# Patient Record
Sex: FEMALE | Race: OTHER | HISPANIC OR LATINO | URBAN - METROPOLITAN AREA
[De-identification: names, ages, dates, MRNs, and addresses within clinical notes are randomized per-mention and may not be internally consistent; named-entity substitution may affect disease eponyms.]

---

## 2021-02-26 ENCOUNTER — EMERGENCY (EMERGENCY)
Facility: HOSPITAL | Age: 66
LOS: 1 days | Discharge: HOME | End: 2021-02-26
Attending: EMERGENCY MEDICINE
Payer: MEDICAID

## 2021-02-26 VITALS
HEART RATE: 94 BPM | HEIGHT: 64 IN | WEIGHT: 184.09 LBS | TEMPERATURE: 98 F | SYSTOLIC BLOOD PRESSURE: 145 MMHG | RESPIRATION RATE: 17 BRPM | OXYGEN SATURATION: 100 % | DIASTOLIC BLOOD PRESSURE: 81 MMHG

## 2021-02-26 PROCEDURE — 73130 X-RAY EXAM OF HAND: CPT

## 2021-02-26 PROCEDURE — 99284 EMERGENCY DEPT VISIT MOD MDM: CPT

## 2021-02-26 PROCEDURE — 90471 IMMUNIZATION ADMIN: CPT

## 2021-02-26 PROCEDURE — 73130 X-RAY EXAM OF HAND: CPT | Mod: 26,RT

## 2021-02-26 PROCEDURE — 99283 EMERGENCY DEPT VISIT LOW MDM: CPT | Mod: 25

## 2021-02-26 PROCEDURE — 90715 TDAP VACCINE 7 YRS/> IM: CPT

## 2021-02-26 RX ORDER — IBUPROFEN 200 MG
600 TABLET ORAL ONCE
Refills: 0 | Status: COMPLETED | OUTPATIENT
Start: 2021-02-26 | End: 2021-02-26

## 2021-02-26 RX ORDER — TETANUS TOXOID, REDUCED DIPHTHERIA TOXOID AND ACELLULAR PERTUSSIS VACCINE, ADSORBED 5; 2.5; 8; 8; 2.5 [IU]/.5ML; [IU]/.5ML; UG/.5ML; UG/.5ML; UG/.5ML
0.5 SUSPENSION INTRAMUSCULAR ONCE
Refills: 0 | Status: COMPLETED | OUTPATIENT
Start: 2021-02-26 | End: 2021-02-26

## 2021-02-26 RX ADMIN — TETANUS TOXOID, REDUCED DIPHTHERIA TOXOID AND ACELLULAR PERTUSSIS VACCINE, ADSORBED 0.5 MILLILITER(S): 5; 2.5; 8; 8; 2.5 SUSPENSION INTRAMUSCULAR at 23:48

## 2021-02-26 RX ADMIN — Medication 600 MILLIGRAM(S): at 23:47

## 2021-02-26 RX ADMIN — Medication 1 TABLET(S): at 23:47

## 2021-02-27 RX ORDER — BACITRACIN ZINC 500 UNIT/G
1 OINTMENT IN PACKET (EA) TOPICAL
Qty: 28 | Refills: 0
Start: 2021-02-27 | End: 2021-03-05

## 2021-02-27 NOTE — ED PROVIDER NOTE - NSFOLLOWUPCLINICS_GEN_ALL_ED_FT
[facility]  Hand Surgery  [address]  [place] [address]  Phone: [phone]  Fax:   Follow Up Time:

## 2021-02-27 NOTE — ED PROVIDER NOTE - PATIENT PORTAL LINK FT
You can access the FollowMyHealth Patient Portal offered by [facility] by registering at the following website: [email or web address]. By joining [employer]’s FollowMyHealth portal, you will also be able to view your health information using other applications (apps) compatible with our system.

## 2021-02-27 NOTE — ED PROVIDER NOTE - PHYSICAL EXAMINATION
right hand with 4 separate small lacerations on thenar area and one laceration with small amount of adipose coming out. full ROM all joints. no significant swelling. normal cap refill. normal gross sensation.

## 2021-02-27 NOTE — ED PROVIDER NOTE - OBJECTIVE STATEMENT
65year old female PMH HTN coming in with dog bite to right hand. state nisha 2 dogs were fighting and she tried to separate them and got bit. unsure last tetanus shot. dogs are UTD with vaccinations including rabies. denies all other complaints. was concerned because wound looked open on her hand.

## 2021-02-27 NOTE — ED PROVIDER NOTE - PROGRESS NOTE DETAILS
no fx on xray. cleansed lacerations. lacs to heal by secondary intetion given dog bite. given tetanus shot, augmentin, motrin. advised f/u with PMD. return precautions discussed.

## 2021-02-27 NOTE — ED PROVIDER NOTE - NSFOLLOWUPINSTRUCTIONS_ED_ALL_ED_FT
[facility]                                                                                     Cuidado de las heridas, en adultos    Wound Care, Adult      El cuidado correcto de las heridas puede ayudar a evitar el dolor y a prevenir las infecciones y formación de cicatrices. Además, puede ayudar a que la cicatrización sea más rápida. Siga las instrucciones del médico acerca del cuidado de la herida.      Materiales necesarios:    •Agua y jabón.      •Limpiador de heridas.      •Gasa.      •Si es necesario, joel venda limpia (vendaje) u otro tipo de material de vendaje para cubrir o colocar en la herida. Siga las instrucciones del médico acerca de qué materiales de vendaje usar.      •Crema o pomada para aplicar en la herida, si se lo indicó el médico.        Cómo cuidar la herida    Limpieza de la herida   Pregúntele al médico cómo limpiar la herida. Bier puede incluir:  •Usar agua y jabón suave o un limpiador de heridas.      •Usar joel gasa limpia para secar la herida dando palmaditas después de limpiarla. No se frote ni restriegue la herida.      Cuidado del vendaje    •Lávese las cuate con agua y jabón lauri al menos 20 segundos antes y después de cambiar el vendaje. Use desinfectante para cuate si no dispone de agua y jabón.    •Cambie el vendaje aubree se lo haya indicado el médico. Bier puede incluir:  •Limpieza o enjuague (irrigación) de la herida.      •Colocar un vendaje sobre la herida o dentro de la herida (taponamiento).      •Cubrir la herida con un vendaje externo.        •No retire los puntos (suturas), la goma para cerrar la piel o las tiras adhesivas. Es posible que estos cierres cutáneos deban quedar puestos en la piel lauri 2 semanas o más tiempo. Si los bordes de las tiras adhesivas empiezan a despegarse y enroscarse, puede recortar los que estén sueltos. No retire las tiras adhesivas por completo a menos que el médico se lo indique.      •Pregúntele al médico cuándo puede dejar la herida al descubierto.        Detección de infección  Controle la shailesh de la herida todos los días para detectar signos de infección. Esté atento a los siguientes signos:  •Aumento del enrojecimiento, la hinchazón o el dolor.      •Líquido o luci.      •Calor.      •Pus o mal olor.        Sigue estas instrucciones en tu casa    Medicamentos     •Si le recetaron un medicamento, joel crema o un ungüento con antibiótico, tómelo o aplíqueselo aubree se lo haya indicado el médico. No deje de usar el antibiótico aunque la afección mejore.      •Si le recetaron un analgésico, tómelo 30 minutos antes de realizar el cuidado de la herida, aubree se lo haya indicado el médico.      •[place] los medicamentos de venta caroline y los recetados solamente aubree se lo haya indicado el médico.      Comida y bebida   •Siga joel dieta que incluya proteínas, vitaminas A y C y otros alimentos ricos en nutrientes que ayudarán a que la herida cicatrice.  •Los alimentos ricos en proteína incluyen carne, pescado, huevos, productos lácteos, frijoles y daria secos.      •Los alimentos ricos en vitamina A incluyen zanahorias y verduras de hoja chelsea oscuro.      •Los alimentos ricos en vitamina C incluyen cítricos, tomates, brócoli y pimientos.        •Lucy suficiente líquido aubree para mantener la orina de color amarillo pálido.      Instrucciones generales     • No tome tanya de inmersión, nade, use el jacuzzi ni key ninguna actividad en la que sumerja la herida en agua hasta que el médico lo autorice. Pregúntele al médico si puede ducharse. Reyes vez solo le permitan darse tanya de esponja.      • No se rasque ni se toque la herida. Manténgala cubierta reyes aubree se lo haya indicado el médico.      •Retome kelly actividades normales según lo indicado por el médico. Pregúntele al médico qué actividades son seguras para usted.      •Proteja la herida del sol lauri los primeros 6 meses o lauri el tiempo que le haya indicado el médico. Cubra la shailesh de la cicatriz o aplíquele pantalla solar con un factor de protección solar (FPS) de al menos 30.      • No consuma ningún producto que contenga nicotina o tabaco, aubree cigarrillos, cigarrillos electrónicos y tabaco de mascar. El consumo de esos productos puede demorar la cicatrización de la herida. Si necesita ayuda para dejar de fumar, consulte al médico.      •Concurra a todas las visitas de seguimiento aubree se lo haya indicado el médico. Bier es importante.        Comuníquese con un médico si:    •Le aplicaron la antitetánica y tiene hinchazón, dolor intenso, enrojecimiento o hemorragia en el sitio de la inyección.      •El dolor no se olu con los medicamentos.    •Tiene cualquiera de estos signos de infección:  •Más enrojecimiento, hinchazón o dolor alrededor de la herida.      •Líquido o luci que sale de la herida.      •Calor que proviene de la herida.      •Advierte pus o mal olor que provienen de la herida.      •Fiebre o escalofríos.        •Siente náuseas o vomita.      •Tiene mareos.        Solicite ayuda de inmediato si:    •Tiene joel línea alisa en la piel cerca de la shailesh que rodea la herida.      •La herida fue cerrada con grapas, suturas, goma para cerrar la piel o tiras adhesivas y estas comienzan a abrirse y a separarse.      •La herida sangra y el sangrado no se detiene con joel presión suave.      •Tiene joel erupción cutánea.      •Se desmaya.      •Tiene dificultad para respirar.      Estos síntomas pueden representar un problema grave que constituye joel emergencia. No espere a nancie si los síntomas desaparecen. Solicite atención médica de inmediato. Comuníquese con el servicio de emergencias de vazquez localidad (911 en los [place]). No conduzca por kelly propios medios hasta el hospital.       Resumen    •Siempre lávese las cuate con agua y jabón lauri al menos 20 segundos antes y después de cambiar el vendaje.      •Cambie el vendaje aubree se lo haya indicado el médico.      •Para ayudar con la cicatrización, coma alimentos ricos en proteínas, vitaminas A y C y demás nutrientes.      •Controle la herida todos los días para detectar signos de infección. Comuníquese con el médico si sospecha que la herida está infectada.      Esta información no tiene aubree fin reemplazar el consejo del médico. Asegúrese de hacerle al médico cualquier pregunta que tenga.      Document Revised: 12/29/2020 Document Reviewed: 12/29/2020    Elsevier Patient Education © 2021 [employer] Inc[employer]

## 2021-12-21 ENCOUNTER — EMERGENCY (EMERGENCY)
Facility: HOSPITAL | Age: 66
LOS: 1 days | Discharge: HOME | End: 2021-12-21
Attending: EMERGENCY MEDICINE
Payer: MEDICAID

## 2021-12-21 VITALS
DIASTOLIC BLOOD PRESSURE: 79 MMHG | HEIGHT: 64 IN | WEIGHT: 190.04 LBS | SYSTOLIC BLOOD PRESSURE: 125 MMHG | HEART RATE: 90 BPM | TEMPERATURE: 98 F | RESPIRATION RATE: 17 BRPM | OXYGEN SATURATION: 97 %

## 2021-12-21 VITALS — TEMPERATURE: 99 F

## 2021-12-21 LAB
ALBUMIN SERPL ELPH-MCNC: 3.6 G/DL — SIGNIFICANT CHANGE UP (ref 3.5–5)
ALP SERPL-CCNC: 89 U/L — SIGNIFICANT CHANGE UP (ref 40–120)
ALT FLD-CCNC: 40 U/L DA — SIGNIFICANT CHANGE UP (ref 10–60)
ANION GAP SERPL CALC-SCNC: 8 MMOL/L — SIGNIFICANT CHANGE UP (ref 5–17)
APPEARANCE UR: CLEAR — SIGNIFICANT CHANGE UP
AST SERPL-CCNC: 24 U/L — SIGNIFICANT CHANGE UP (ref 10–40)
BASOPHILS # BLD AUTO: 0.03 K/UL — SIGNIFICANT CHANGE UP (ref 0–0.2)
BASOPHILS NFR BLD AUTO: 0.4 % — SIGNIFICANT CHANGE UP (ref 0–2)
BILIRUB SERPL-MCNC: 0.4 MG/DL — SIGNIFICANT CHANGE UP (ref 0.2–1.2)
BILIRUB UR-MCNC: NEGATIVE — SIGNIFICANT CHANGE UP
BUN SERPL-MCNC: 18 MG/DL — SIGNIFICANT CHANGE UP (ref 7–18)
CALCIUM SERPL-MCNC: 9.4 MG/DL — SIGNIFICANT CHANGE UP (ref 8.4–10.5)
CHLORIDE SERPL-SCNC: 109 MMOL/L — HIGH (ref 96–108)
CO2 SERPL-SCNC: 24 MMOL/L — SIGNIFICANT CHANGE UP (ref 22–31)
COLOR SPEC: YELLOW — SIGNIFICANT CHANGE UP
CREAT SERPL-MCNC: 0.87 MG/DL — SIGNIFICANT CHANGE UP (ref 0.5–1.3)
DIFF PNL FLD: ABNORMAL
EOSINOPHIL # BLD AUTO: 0.07 K/UL — SIGNIFICANT CHANGE UP (ref 0–0.5)
EOSINOPHIL NFR BLD AUTO: 0.9 % — SIGNIFICANT CHANGE UP (ref 0–6)
GLUCOSE SERPL-MCNC: 105 MG/DL — HIGH (ref 70–99)
GLUCOSE UR QL: NEGATIVE — SIGNIFICANT CHANGE UP
HCT VFR BLD CALC: 39.4 % — SIGNIFICANT CHANGE UP (ref 34.5–45)
HGB BLD-MCNC: 12.6 G/DL — SIGNIFICANT CHANGE UP (ref 11.5–15.5)
IMM GRANULOCYTES NFR BLD AUTO: 0.3 % — SIGNIFICANT CHANGE UP (ref 0–1.5)
KETONES UR-MCNC: NEGATIVE — SIGNIFICANT CHANGE UP
LEUKOCYTE ESTERASE UR-ACNC: NEGATIVE — SIGNIFICANT CHANGE UP
LYMPHOCYTES # BLD AUTO: 1.7 K/UL — SIGNIFICANT CHANGE UP (ref 1–3.3)
LYMPHOCYTES # BLD AUTO: 22.7 % — SIGNIFICANT CHANGE UP (ref 13–44)
MCHC RBC-ENTMCNC: 27.5 PG — SIGNIFICANT CHANGE UP (ref 27–34)
MCHC RBC-ENTMCNC: 32 GM/DL — SIGNIFICANT CHANGE UP (ref 32–36)
MCV RBC AUTO: 86 FL — SIGNIFICANT CHANGE UP (ref 80–100)
MONOCYTES # BLD AUTO: 0.62 K/UL — SIGNIFICANT CHANGE UP (ref 0–0.9)
MONOCYTES NFR BLD AUTO: 8.3 % — SIGNIFICANT CHANGE UP (ref 2–14)
NEUTROPHILS # BLD AUTO: 5.05 K/UL — SIGNIFICANT CHANGE UP (ref 1.8–7.4)
NEUTROPHILS NFR BLD AUTO: 67.4 % — SIGNIFICANT CHANGE UP (ref 43–77)
NITRITE UR-MCNC: NEGATIVE — SIGNIFICANT CHANGE UP
NRBC # BLD: 0 /100 WBCS — SIGNIFICANT CHANGE UP (ref 0–0)
PH UR: 7 — SIGNIFICANT CHANGE UP (ref 5–8)
PLATELET # BLD AUTO: 274 K/UL — SIGNIFICANT CHANGE UP (ref 150–400)
POTASSIUM SERPL-MCNC: 4 MMOL/L — SIGNIFICANT CHANGE UP (ref 3.5–5.3)
POTASSIUM SERPL-SCNC: 4 MMOL/L — SIGNIFICANT CHANGE UP (ref 3.5–5.3)
PROT SERPL-MCNC: 7.4 G/DL — SIGNIFICANT CHANGE UP (ref 6–8.3)
PROT UR-MCNC: NEGATIVE — SIGNIFICANT CHANGE UP
RBC # BLD: 4.58 M/UL — SIGNIFICANT CHANGE UP (ref 3.8–5.2)
RBC # FLD: 13.1 % — SIGNIFICANT CHANGE UP (ref 10.3–14.5)
SODIUM SERPL-SCNC: 141 MMOL/L — SIGNIFICANT CHANGE UP (ref 135–145)
SP GR SPEC: 1.01 — SIGNIFICANT CHANGE UP (ref 1.01–1.02)
TROPONIN I, HIGH SENSITIVITY RESULT: 5.7 NG/L — SIGNIFICANT CHANGE UP
UROBILINOGEN FLD QL: NEGATIVE — SIGNIFICANT CHANGE UP
WBC # BLD: 7.49 K/UL — SIGNIFICANT CHANGE UP (ref 3.8–10.5)
WBC # FLD AUTO: 7.49 K/UL — SIGNIFICANT CHANGE UP (ref 3.8–10.5)

## 2021-12-21 PROCEDURE — G1004: CPT

## 2021-12-21 PROCEDURE — 80053 COMPREHEN METABOLIC PANEL: CPT

## 2021-12-21 PROCEDURE — 36415 COLL VENOUS BLD VENIPUNCTURE: CPT

## 2021-12-21 PROCEDURE — 84484 ASSAY OF TROPONIN QUANT: CPT

## 2021-12-21 PROCEDURE — 70450 CT HEAD/BRAIN W/O DYE: CPT | Mod: MG

## 2021-12-21 PROCEDURE — 85025 COMPLETE CBC W/AUTO DIFF WBC: CPT

## 2021-12-21 PROCEDURE — 70450 CT HEAD/BRAIN W/O DYE: CPT | Mod: 26,MG

## 2021-12-21 PROCEDURE — 81001 URINALYSIS AUTO W/SCOPE: CPT

## 2021-12-21 PROCEDURE — 93005 ELECTROCARDIOGRAM TRACING: CPT

## 2021-12-21 PROCEDURE — 82962 GLUCOSE BLOOD TEST: CPT

## 2021-12-21 PROCEDURE — 99284 EMERGENCY DEPT VISIT MOD MDM: CPT | Mod: 25

## 2021-12-21 PROCEDURE — 99285 EMERGENCY DEPT VISIT HI MDM: CPT

## 2021-12-21 RX ORDER — MECLIZINE HCL 12.5 MG
25 TABLET ORAL ONCE
Refills: 0 | Status: COMPLETED | OUTPATIENT
Start: 2021-12-21 | End: 2021-12-21

## 2021-12-21 RX ORDER — MECLIZINE HCL 12.5 MG
1 TABLET ORAL
Qty: 30 | Refills: 0
Start: 2021-12-21

## 2021-12-21 RX ADMIN — Medication 25 MILLIGRAM(S): at 14:10

## 2021-12-21 NOTE — ED PROVIDER NOTE - NSICDXPASTMEDICALHX_GEN_ALL_CORE_FT
PAST MEDICAL HISTORY:  Hodgkin's lymphoma     HTN (hypertension)     Lymphoma in remission     Prediabetes

## 2021-12-21 NOTE — ED PROVIDER NOTE - PROGRESS NOTE DETAILS
pt reassessed and dizziness resolved.  labs and imaging negative.  Pt given copy of all results.  Will dc

## 2021-12-21 NOTE — ED ADULT NURSE NOTE - OBJECTIVE STATEMENT
pt is a66 y/o female w/ c/o dizziness and high blood pressure  pt  Alert oriented x3 with no signs of any distress nor discomfort pt no  c/o any pain , ambulates  no signs of any deficit  noted . follow simple instruction and follow simple command.

## 2021-12-21 NOTE — ED PROVIDER NOTE - OBJECTIVE STATEMENT
65 y/o female, w/ history of hypertension, prediabetes, and Hodgkin's lymphoma, in remission, says for the past day has been feeling "weird" and not like herself. Patient awoke this morning and felt dizzy, described as both room spinning and weak and lightheaded. Patient has had these symptoms previously, but not regularly. Patient was at work, they took her blood pressure, and it was 155/85. Patient is normally 120/80 so this is high for her and they sent her to ED for further evaluation. In ED, blood pressure is normal. Patient denies any other symptoms. NKDA.

## 2021-12-21 NOTE — ED PROVIDER NOTE - PATIENT PORTAL LINK FT
You can access the FollowMyHealth Patient Portal offered by [facility] by registering at the following website: [email or web address]. By joining [employer]’s FollowMyHealth portal, you will also be able to view your health information using other applications (apps) compatible with our system.

## 2023-03-27 ENCOUNTER — EMERGENCY (EMERGENCY)
Facility: HOSPITAL | Age: 68
LOS: 1 days | Discharge: HOME | End: 2023-03-27
Attending: EMERGENCY MEDICINE
Payer: MEDICAID

## 2023-03-27 PROBLEM — I10 ESSENTIAL (PRIMARY) HYPERTENSION: Chronic | Status: ACTIVE | Noted: 2021-12-21

## 2023-03-27 PROBLEM — C85.90 NON-HODGKIN LYMPHOMA, UNSPECIFIED, UNSPECIFIED SITE: Chronic | Status: ACTIVE | Noted: 2021-12-21

## 2023-03-27 PROBLEM — C81.90 HODGKIN LYMPHOMA, UNSPECIFIED, UNSPECIFIED SITE: Chronic | Status: ACTIVE | Noted: 2021-12-21

## 2023-03-27 PROBLEM — R73.03 PREDIABETES: Chronic | Status: ACTIVE | Noted: 2021-12-21

## 2023-03-27 PROCEDURE — 84484 ASSAY OF TROPONIN QUANT: CPT

## 2023-03-27 PROCEDURE — 93010 ELECTROCARDIOGRAM REPORT: CPT

## 2023-03-27 PROCEDURE — 81001 URINALYSIS AUTO W/SCOPE: CPT

## 2023-03-27 PROCEDURE — 87635 SARS-COV-2 COVID-19 AMP PRB: CPT

## 2023-03-27 PROCEDURE — 99284 EMERGENCY DEPT VISIT MOD MDM: CPT

## 2023-03-27 PROCEDURE — 83690 ASSAY OF LIPASE: CPT

## 2023-03-27 PROCEDURE — 71046 X-RAY EXAM CHEST 2 VIEWS: CPT

## 2023-03-27 PROCEDURE — 80053 COMPREHEN METABOLIC PANEL: CPT

## 2023-03-27 PROCEDURE — 36415 COLL VENOUS BLD VENIPUNCTURE: CPT

## 2023-03-27 PROCEDURE — 96374 THER/PROPH/DIAG INJ IV PUSH: CPT

## 2023-03-27 PROCEDURE — 82962 GLUCOSE BLOOD TEST: CPT

## 2023-03-27 PROCEDURE — 71046 X-RAY EXAM CHEST 2 VIEWS: CPT | Mod: 26

## 2023-03-27 PROCEDURE — 70450 CT HEAD/BRAIN W/O DYE: CPT

## 2023-03-27 PROCEDURE — 93005 ELECTROCARDIOGRAM TRACING: CPT

## 2023-03-27 PROCEDURE — 70450 CT HEAD/BRAIN W/O DYE: CPT | Mod: 26

## 2023-03-27 PROCEDURE — 85027 COMPLETE CBC AUTOMATED: CPT

## 2023-03-27 PROCEDURE — 99285 EMERGENCY DEPT VISIT HI MDM: CPT | Mod: 25

## 2023-03-31 NOTE — DOWNTIME INTERRUPTION NOTE - WHICH MANUAL FORMS INITIATED?
Shine Cedeño, PGY-3, MD: I was not involved in the care of this patient and am only entering information to back log for downtime. Regarding Emergency Department care during this visit/admission, please see paper chart for isolation and medical management details, as the mandatory fields in the disposition section may not be accurate.

## 2023-04-16 NOTE — ED PROVIDER NOTE - CLINICAL SUMMARY MEDICAL DECISION MAKING FREE TEXT BOX
69 y/o female with /o dizziness and diarrhea   neuro nonfocaL  abdomen soft nontender  labs IVF   reevalaute   pt can be d/c home

## 2023-12-28 ENCOUNTER — EMERGENCY (EMERGENCY)
Facility: HOSPITAL | Age: 68
LOS: 1 days | Discharge: HOME | End: 2023-12-28
Attending: EMERGENCY MEDICINE
Payer: MEDICAID

## 2023-12-28 VITALS
SYSTOLIC BLOOD PRESSURE: 123 MMHG | HEART RATE: 100 BPM | OXYGEN SATURATION: 97 % | DIASTOLIC BLOOD PRESSURE: 78 MMHG | RESPIRATION RATE: 20 BRPM | TEMPERATURE: 99 F | HEIGHT: 63 IN | WEIGHT: 173.94 LBS

## 2023-12-28 LAB
FLUAV AG NPH QL: SIGNIFICANT CHANGE UP
FLUAV AG NPH QL: SIGNIFICANT CHANGE UP
FLUBV AG NPH QL: SIGNIFICANT CHANGE UP
FLUBV AG NPH QL: SIGNIFICANT CHANGE UP
SARS-COV-2 RNA SPEC QL NAA+PROBE: DETECTED
SARS-COV-2 RNA SPEC QL NAA+PROBE: DETECTED

## 2023-12-28 PROCEDURE — 99284 EMERGENCY DEPT VISIT MOD MDM: CPT

## 2023-12-28 PROCEDURE — 71046 X-RAY EXAM CHEST 2 VIEWS: CPT | Mod: 26

## 2023-12-28 PROCEDURE — 87637 SARSCOV2&INF A&B&RSV AMP PRB: CPT

## 2023-12-28 PROCEDURE — 71046 X-RAY EXAM CHEST 2 VIEWS: CPT

## 2023-12-28 PROCEDURE — 99283 EMERGENCY DEPT VISIT LOW MDM: CPT | Mod: 25

## 2023-12-28 RX ORDER — IBUPROFEN 200 MG
600 TABLET ORAL ONCE
Refills: 0 | Status: COMPLETED | OUTPATIENT
Start: 2023-12-28 | End: 2023-12-28

## 2023-12-28 RX ORDER — ALBUTEROL 90 UG/1
2 AEROSOL, METERED ORAL
Qty: 1 | Refills: 0
Start: 2023-12-28 | End: 2024-01-03

## 2023-12-28 RX ORDER — DEXAMETHASONE 0.5 MG/5ML
6 ELIXIR ORAL ONCE
Refills: 0 | Status: COMPLETED | OUTPATIENT
Start: 2023-12-28 | End: 2023-12-28

## 2023-12-28 RX ORDER — IPRATROPIUM/ALBUTEROL SULFATE 18-103MCG
3 AEROSOL WITH ADAPTER (GRAM) INHALATION ONCE
Refills: 0 | Status: DISCONTINUED | OUTPATIENT
Start: 2023-12-28 | End: 2024-01-01

## 2023-12-28 RX ADMIN — Medication 200 MILLIGRAM(S): at 18:28

## 2023-12-28 RX ADMIN — Medication 6 MILLIGRAM(S): at 18:28

## 2023-12-28 RX ADMIN — Medication 600 MILLIGRAM(S): at 18:28

## 2023-12-28 NOTE — ED PROVIDER NOTE - NSFOLLOWUPINSTRUCTIONS_ED_ALL_ED_FT
upper respiratory infection. symptomatic management. ibuprofen 600mg every 6 hrs and/or tylenol 650mg every 4 hrs as needed. stay hydrated. rest. usually worse day 3-4 and improves after 7-10 days. see your MD. return if worsens.

## 2023-12-28 NOTE — ED PROVIDER NOTE - CLINICAL SUMMARY MEDICAL DECISION MAKING FREE TEXT BOX
68 yr old female with hx of HTN, prediabetes, and Hodgkin's lymphoma, in remission, presents to ed c/o wet cough, mid chest congestion, sob, fatigue, rhinorrhea, wheezing x 3 days. was sick 1 month ago and given abx by urgent care. no weigh loss, no hemoptysis     consistent with uri vs pna vs bronchitis. swab, xr, meds, re-assess

## 2023-12-28 NOTE — ED PROVIDER NOTE - OBJECTIVE STATEMENT
68 yr old female with hx of HTN, prediabetes, and Hodgkin's lymphoma, in remission, presents to ed c/o wet cough, mid chest congestion, sob, fatigue, rhinorrhea, wheezing x 3 days. was sick 1 month ago and given abx by urgent care. no weigh loss, no hemoptysis

## 2023-12-28 NOTE — ED PROVIDER NOTE - PATIENT PORTAL LINK FT
You can access the FollowMyHealth Patient Portal offered by [facility] by registering at the following website: [email or web address]. By joining [employer]’s FollowMyHealth portal, you will also be able to view your health information using other applications (apps) compatible with our system. You can access the FollowMyHealth Patient Portal offered by [facility] by registering at the following website: [email or web address]. By joining [employer]’s FollowMyHealth portal, you will also be able to view your health information using other applications (apps) compatible with our system.